# Patient Record
Sex: FEMALE | URBAN - METROPOLITAN AREA
[De-identification: names, ages, dates, MRNs, and addresses within clinical notes are randomized per-mention and may not be internally consistent; named-entity substitution may affect disease eponyms.]

---

## 2017-04-14 ENCOUNTER — IMPORTED ENCOUNTER (OUTPATIENT)
Dept: URBAN - METROPOLITAN AREA CLINIC 1 | Facility: CLINIC | Age: 22
End: 2017-04-14

## 2017-04-14 PROBLEM — H10.45: Noted: 2017-04-14

## 2017-04-14 PROCEDURE — 92014 COMPRE OPH EXAM EST PT 1/>: CPT

## 2017-04-14 PROCEDURE — 92015 DETERMINE REFRACTIVE STATE: CPT

## 2017-04-14 NOTE — PATIENT DISCUSSION
1.  Allergic Conjunctivitis OU :  Condition discussed with patient. Advised patient to use OTC Zaditor one drop OU BID prn.2. Headaches - cont to monitor MRX for glasses givenReturn for an appointment in 1 year 27 with Dr. Shahida Garnica.

## 2018-04-16 ENCOUNTER — IMPORTED ENCOUNTER (OUTPATIENT)
Dept: URBAN - METROPOLITAN AREA CLINIC 1 | Facility: CLINIC | Age: 23
End: 2018-04-16

## 2018-04-16 PROBLEM — H10.45: Noted: 2018-04-16

## 2018-04-16 PROCEDURE — 92014 COMPRE OPH EXAM EST PT 1/>: CPT

## 2018-07-25 ENCOUNTER — IMPORTED ENCOUNTER (OUTPATIENT)
Dept: URBAN - METROPOLITAN AREA CLINIC 1 | Facility: CLINIC | Age: 23
End: 2018-07-25

## 2018-07-25 PROCEDURE — 92015 DETERMINE REFRACTIVE STATE: CPT

## 2018-07-25 NOTE — PATIENT DISCUSSION
1.  Refraction Only - MRX for glasses givenReturn for an appointment for Return as scheduled with Dr. Bridget Burrell.

## 2019-04-15 ENCOUNTER — IMPORTED ENCOUNTER (OUTPATIENT)
Dept: URBAN - METROPOLITAN AREA CLINIC 1 | Facility: CLINIC | Age: 24
End: 2019-04-15

## 2019-04-15 PROBLEM — H10.45: Noted: 2019-04-15

## 2019-04-15 PROCEDURE — 92014 COMPRE OPH EXAM EST PT 1/>: CPT

## 2019-04-15 NOTE — PATIENT DISCUSSION
1.  Allergic Conjunctivitis OU -- Condition discussed with patient. Advised patient to use OTC Zaditor one drop OU BID prn. Patient defers the refraction at today's visitReturn for an appointment in 1 year for a 30 with Dr. Nick Grayson.

## 2022-04-02 ASSESSMENT — KERATOMETRY
OS_K1POWER_DIOPTERS: 43.00
OD_K2POWER_DIOPTERS: 45.25
OS_K2POWER_DIOPTERS: 46.25
OS_AXISANGLE2_DEGREES: 083
OD_K1POWER_DIOPTERS: 43.00
OD_AXISANGLE2_DEGREES: 088
OS_AXISANGLE_DEGREES: 173
OD_AXISANGLE_DEGREES: 178

## 2022-04-02 ASSESSMENT — VISUAL ACUITY
OS_CC: J1+
OS_SC: 20/25
OD_SC: 20/20
OD_CC: J1+
OS_SC: 20/20
OS_SC: 20/20
OS_CC: J1+
OD_SC: 20/20
OD_SC: 20/20
OS_SC: 20/20
OD_SC: 20/25
OD_CC: J1+

## 2022-04-02 ASSESSMENT — TONOMETRY
OD_IOP_MMHG: 17
OS_IOP_MMHG: 19
OS_IOP_MMHG: 17
OD_IOP_MMHG: 17
OD_IOP_MMHG: 19
OS_IOP_MMHG: 17